# Patient Record
Sex: FEMALE | Race: WHITE | NOT HISPANIC OR LATINO | Employment: FULL TIME | URBAN - METROPOLITAN AREA
[De-identification: names, ages, dates, MRNs, and addresses within clinical notes are randomized per-mention and may not be internally consistent; named-entity substitution may affect disease eponyms.]

---

## 2018-07-08 ENCOUNTER — APPOINTMENT (OUTPATIENT)
Dept: GENERAL RADIOLOGY | Facility: CLINIC | Age: 53
End: 2018-07-08
Attending: EMERGENCY MEDICINE
Payer: COMMERCIAL

## 2018-07-08 ENCOUNTER — HOSPITAL ENCOUNTER (EMERGENCY)
Facility: CLINIC | Age: 53
Discharge: HOME | End: 2018-07-08
Attending: EMERGENCY MEDICINE | Admitting: EMERGENCY MEDICINE
Payer: COMMERCIAL

## 2018-07-08 VITALS
RESPIRATION RATE: 11 BRPM | HEIGHT: 64 IN | BODY MASS INDEX: 25.61 KG/M2 | TEMPERATURE: 97 F | WEIGHT: 150 LBS | DIASTOLIC BLOOD PRESSURE: 69 MMHG | SYSTOLIC BLOOD PRESSURE: 108 MMHG | OXYGEN SATURATION: 96 % | HEART RATE: 83 BPM

## 2018-07-08 DIAGNOSIS — R05.9 COUGH: ICD-10-CM

## 2018-07-08 DIAGNOSIS — R07.89 ATYPICAL CHEST PAIN: ICD-10-CM

## 2018-07-08 LAB
ALBUMIN SERPL-MCNC: 3.7 G/DL (ref 3.4–5)
ALP SERPL-CCNC: 61 U/L (ref 40–150)
ALT SERPL W P-5'-P-CCNC: 31 U/L (ref 0–50)
ANION GAP SERPL CALCULATED.3IONS-SCNC: 6 MMOL/L (ref 3–14)
AST SERPL W P-5'-P-CCNC: 23 U/L (ref 0–45)
BASOPHILS # BLD AUTO: 0 10E9/L (ref 0–0.2)
BASOPHILS NFR BLD AUTO: 0.3 %
BILIRUB SERPL-MCNC: 0.4 MG/DL (ref 0.2–1.3)
BUN SERPL-MCNC: 9 MG/DL (ref 7–30)
CALCIUM SERPL-MCNC: 8.7 MG/DL (ref 8.5–10.1)
CHLORIDE SERPL-SCNC: 106 MMOL/L (ref 94–109)
CO2 SERPL-SCNC: 27 MMOL/L (ref 20–32)
CREAT SERPL-MCNC: 0.59 MG/DL (ref 0.52–1.04)
DIFFERENTIAL METHOD BLD: ABNORMAL
EOSINOPHIL # BLD AUTO: 0 10E9/L (ref 0–0.7)
EOSINOPHIL NFR BLD AUTO: 0.5 %
ERYTHROCYTE [DISTWIDTH] IN BLOOD BY AUTOMATED COUNT: 12.4 % (ref 10–15)
GFR SERPL CREATININE-BSD FRML MDRD: >90 ML/MIN/1.7M2
GLUCOSE SERPL-MCNC: 109 MG/DL (ref 70–99)
HCT VFR BLD AUTO: 33.9 % (ref 35–47)
HGB BLD-MCNC: 11.8 G/DL (ref 11.7–15.7)
IMM GRANULOCYTES # BLD: 0 10E9/L (ref 0–0.4)
IMM GRANULOCYTES NFR BLD: 0.3 %
INTERPRETATION ECG - MUSE: NORMAL
LYMPHOCYTES # BLD AUTO: 2.2 10E9/L (ref 0.8–5.3)
LYMPHOCYTES NFR BLD AUTO: 29.5 %
MCH RBC QN AUTO: 31.3 PG (ref 26.5–33)
MCHC RBC AUTO-ENTMCNC: 34.8 G/DL (ref 31.5–36.5)
MCV RBC AUTO: 90 FL (ref 78–100)
MONOCYTES # BLD AUTO: 0.6 10E9/L (ref 0–1.3)
MONOCYTES NFR BLD AUTO: 7.5 %
NEUTROPHILS # BLD AUTO: 4.7 10E9/L (ref 1.6–8.3)
NEUTROPHILS NFR BLD AUTO: 61.9 %
NRBC # BLD AUTO: 0 10*3/UL
NRBC BLD AUTO-RTO: 0 /100
PLATELET # BLD AUTO: 201 10E9/L (ref 150–450)
POTASSIUM SERPL-SCNC: 3.7 MMOL/L (ref 3.4–5.3)
PROT SERPL-MCNC: 7.1 G/DL (ref 6.8–8.8)
RBC # BLD AUTO: 3.77 10E12/L (ref 3.8–5.2)
SODIUM SERPL-SCNC: 139 MMOL/L (ref 133–144)
TROPONIN I SERPL-MCNC: <0.015 UG/L (ref 0–0.04)
WBC # BLD AUTO: 7.5 10E9/L (ref 4–11)

## 2018-07-08 PROCEDURE — 80053 COMPREHEN METABOLIC PANEL: CPT | Performed by: EMERGENCY MEDICINE

## 2018-07-08 PROCEDURE — 25000125 ZZHC RX 250: Performed by: EMERGENCY MEDICINE

## 2018-07-08 PROCEDURE — 84484 ASSAY OF TROPONIN QUANT: CPT | Performed by: EMERGENCY MEDICINE

## 2018-07-08 PROCEDURE — 93005 ELECTROCARDIOGRAM TRACING: CPT

## 2018-07-08 PROCEDURE — 85025 COMPLETE CBC W/AUTO DIFF WBC: CPT | Performed by: EMERGENCY MEDICINE

## 2018-07-08 PROCEDURE — 99285 EMERGENCY DEPT VISIT HI MDM: CPT | Mod: 25

## 2018-07-08 PROCEDURE — 71046 X-RAY EXAM CHEST 2 VIEWS: CPT

## 2018-07-08 PROCEDURE — 25000132 ZZH RX MED GY IP 250 OP 250 PS 637: Performed by: EMERGENCY MEDICINE

## 2018-07-08 RX ORDER — MULTIVIT,CALC,MINS/IRON/FOLIC 500-18-0.4
TABLET ORAL
COMMUNITY
Start: 2005-10-18

## 2018-07-08 RX ORDER — ATORVASTATIN CALCIUM 10 MG/1
10 TABLET, FILM COATED ORAL
COMMUNITY
Start: 2016-03-21

## 2018-07-08 RX ORDER — PANTOPRAZOLE SODIUM 20 MG/1
20 TABLET, DELAYED RELEASE ORAL DAILY
Qty: 30 TABLET | Refills: 0 | Status: SHIPPED | OUTPATIENT
Start: 2018-07-08 | End: 2018-08-07

## 2018-07-08 RX ORDER — ASPIRIN 325 MG
325 TABLET ORAL
COMMUNITY
Start: 2016-03-21

## 2018-07-08 RX ADMIN — LIDOCAINE HYDROCHLORIDE 30 ML: 20 SOLUTION ORAL; TOPICAL at 01:48

## 2018-07-08 ASSESSMENT — ENCOUNTER SYMPTOMS
CHILLS: 1
DIARRHEA: 0
DIZZINESS: 0
LIGHT-HEADEDNESS: 0
SORE THROAT: 1
SHORTNESS OF BREATH: 1
COUGH: 1
PALPITATIONS: 1
CONSTIPATION: 1

## 2018-07-08 NOTE — ED PROVIDER NOTES
"  History     Chief Complaint:  Palpitations and Flu Symptoms    HPI   Katt Walden is an otherwise healthy 53 year old female with reported acid reflux who presents to the emergency department today for evaluation of palpitations and flu-like symptoms. The patient reports an onset of intermittent palpitations and chest pressure that began last night that last less than a minute that only occur when she is lying down. Additionally, she has been experiencing flu-like symptoms including congestion, chills, sore throat, dry cough, and constipation for the past three days and had one episode of emesis today.  Tonight, she had another episode of chest pressure with left arm pain and shortness of breath. She was concerned about these symptoms and the palpitation episodes prompting her visit to the emergency department. She denies dizziness, light headedness, and diarrhea. Of note, the patient does have a family history of MIs and heart disease in their 50s. She has had cardiac CTs in the past and notes her last one showed mild \"plaque build-up\" but did not require additional intervention.     Allergies:  Sulfa Drugs    Medications:    Asprin  Atorvastatin  Docosahexaenoic acid   Multiple Vitamins-calcium    Past Medical History:    History reviewed. No pertinent past medical history.    Past Surgical History:        Family History:    Heart disease    Social History:  The patient was accompanied to the ED by sons.  Smoking Status: Former  Smokeless Tobacco: Never  Alcohol Use: No  Marital Status:      Review of Systems   Constitutional: Positive for chills.   HENT: Positive for congestion and sore throat.    Respiratory: Positive for cough and shortness of breath.    Cardiovascular: Positive for chest pain and palpitations. Negative for leg swelling.   Gastrointestinal: Positive for constipation. Negative for diarrhea.   Neurological: Negative for dizziness and light-headedness.   All other systems " "reviewed and are negative.    Physical Exam     Patient Vitals for the past 24 hrs:   BP Temp Temp src Pulse Heart Rate Resp SpO2 Height Weight   07/08/18 0106 - 97  F (36.1  C) Temporal - - - - - -   07/08/18 0102 129/76 - - 83 83 20 96 % 1.626 m (5' 4\") 68 kg (150 lb)         Physical Exam  Gen: Pleasant, appears stated age.    Eye:   Pupils are equal, round, and reactive.     Sclera non-injected.    ENT:   Moist mucus membranes.     Normal tongue.    Oropharynx without lesions.    Cardiac:     Normal rate and regular rhythm.    No murmurs, gallops, or rubs.   2+ radial pulses    Pulmonary:     Clear to auscultation bilaterally.    No wheezes, rales, or rhonchi.    Abdomen:     Normal active bowel sounds.     Abdomen is soft and non-distended, without focal tenderness.    Musculoskeletal:     Normal movement of all extremities without evidence for deficit.   Anterior chest wall tenderness    Extremities:    No edema.   Calves non-tender    Skin:   Warm and dry.    Neurologic:    Non-focal exam without asymmetric weakness or numbness.    Normal tone    Psychiatric:     Normal affect with appropriate interaction with examiner.    Emergency Department Course   ECG:  Indication: palpitations  Completed at 0103.  Read at 0138.   Normal sinus rhythm  Normal ECG   Rate 84 bpm. NY interval 146. QRS duration 88. QT/QTc 354/418. P-R-T axes 63  72  47.    Imaging:  Radiology findings were communicated with the patient and family who voiced understanding of the findings.  XR Chest 2 views  IMPRESSION: PA and lateral views of the chest. Lungs are clear. Heart  is normal in size. No effusions are evident. No pneumothorax.  Report per radiology     Laboratory:  Laboratory findings were communicated with the patient and family who voiced understanding of the findings.  CBC: WNL. (WBC 7.5, HGB 11.8, )   CMP: glucose 109 (H) o/w WNL (Creatinine 0.59)    Interventions:  0148 GI Cocktail 30mg PO    Emergency Department " "Course:  Nursing notes and vitals reviewed.  IV was inserted and blood was drawn for laboratory testing, results above.  The patient was sent for a XR Chest 2 views while in the emergency department, results above.   0120: I performed an exam of the patient as documented above.   0323: Patient rechecked and updated. The patient is feeling better after GI cocktail  Findings and plan explained to the Patient and sons. Patient discharged home with instructions regarding supportive care, medications, and reasons to return. The importance of close follow-up was reviewed. The patient was prescribed Protonix.   I personally reviewed the laboratory and imaging results with the Patient and sons and answered all related questions prior to discharge.    Impression & Plan    Medical Decision Making:  Katt Walden is a 53 year old female with a history of IBS who presents today with a constellation of symptoms including chest pressure, and \"flu-like symptoms\". On exam, the patient is well-appearing with normal vitals. She does not appear toxic. In regards to her chest discomfort, her history is very atypical for acute coronary syndrome. Symptoms seems worse with lying flat and she does not notice them at all when exerting herself. Overall, my suspicion for acute coronary syndrome is very low. She has a normal appearing EKG and negative troponin. She reportedly has non-obstructive coronary artery disease based on CT chest. Otherwise, I do not suspect pericarditis, PE, pneumonia, or aortic dissection. Chest radiograph otherwise appears normal. Otherwise, labs were only remarkable for anemia. Patient reportedly has a history of anemia in the past. I have recommended PCP follow up. At this point, I think that she is safe to be discharged home. She did get some relief with GI cocktail, therefore I started her on Protonix. She will return to the emergency department with any worsening symptoms. At this point, I do not think stress " testing is indicated given symptoms are unlikely to represent ACS.     Diagnosis:    ICD-10-CM    1. Cough R05    2. Atypical chest pain R07.89        Disposition:  discharged to home    Discharge Medications:  New Prescriptions    PANTOPRAZOLE (PROTONIX) 20 MG EC TABLET    Take 1 tablet (20 mg) by mouth daily for 30 doses Take 30 min before eating.       Scribe Disclosure:  Armin FULLER, lizeth serving as a scribe at 1:16 AM on 7/8/2018 to document services personally performed by Alina Swain MD based on my observations and the provider's statements to me.     7/8/2018   [facility] EMERGENCY DEPARTMENT       Alina Swain MD  07/08/18 0702

## 2018-07-08 NOTE — ED TRIAGE NOTES
Pt arrives to the ED for palpitations that began yesterday. Denies any SOB, or dizziness. Pt states she has been having flu like symptoms since Thursday and that she had an episode of vomiting today. Pt states having family h/o of cardiac issues.

## 2018-07-08 NOTE — ED AVS SNAPSHOT
[facility]    [address]    [facility] [address]    Phone:  [phone]    Fax:  [phone]                                       Katt Walden   MRN: [id number]    Department:  [facility]   Date of Visit:  7/8/2018           Patient Information     Date Of Birth          1965        Your diagnoses for this visit were:     Cough     Atypical chest pain        You were seen by Alina Swain MD.      Follow-up Information     Follow up with [facility].    Specialty:  EMERGENCY MEDICINE    Why:  immediately , If symptoms worsen    Contact information:    [address]  [facility] [address] [phone]        Follow up with C[facility] In 3 days.    Specialty:  Family Practice    Why:  for recheck of your symptoms    Contact information:    [facility]  [address]  [facility] [id number] [phone]          Discharge Instructions       Discharge Instructions  Chest Pain    You have been seen today for chest pain or discomfort.  At this time, your doctor has found no signs that your chest pain is due to a serious or life-threatening condition, (or you have declined more testing and/or admission to the hospital). However, sometimes there is a serious problem that does not show up right away. Your evaluation today may not be complete and you may need further testing and evaluation.     You need to follow-up with your regular doctor within 3 days.    Return to the Emergency Department if:    Your chest pain changes, gets worse, starts to happen more often, or comes with less activity.    You are short of breath.    You get very weak or tired.    You pass out or faint.    You have any new symptoms, like fever, cough, numb legs, or you cough up blood.    You have anything else that worries you.    Until you follow-up with your regular doctor  please do the following:    Take one aspirin daily unless you have an allergy or are told not to by your doctor.    If a stress test appointment has been made, go to the appointment.    If you have questions, contact your regular doctor.    If your doctor today has told you to follow-up with your regular doctor, it is very important that you make an appointment with your clinic and go to the appointment.  If you do not follow-up with your primary doctor, it may result in missing an important development which could result in permanent injury or disability and/or lasting pain.  If there is any problem keeping your appointment, call your doctor or return to the Emergency Department.    If you were given a prescription for medicine here today, be sure to read all of the information (including the package insert) that comes with your prescription.  This will include important information about the medicine, its side effects, and any warnings that you need to know about.  The pharmacist who fills the prescription can provide more information and answer questions you may have about the medicine.  If you have questions or concerns that the pharmacist cannot address, please call or return to the Emergency Department.         24 Hour Appointment Hotline       To make an appointment at any [facility], call [phone] ([phone]). If you don't have a family doctor or clinic, we will help you find one. [facility] are conveniently located to serve the needs of you and your family.             Review of your medicines      START taking        Dose / Directions Last dose taken    pantoprazole 20 MG EC tablet   Commonly known as:  PROTONIX   Dose:  20 mg   Quantity:  30 tablet        Take 1 tablet (20 mg) by mouth daily for 30 doses Take 30 min before eating.   Refills:  0          Our records show that you are taking the medicines listed below. If these are incorrect, please call your family doctor or clinic.         Dose / Directions Last dose taken    aspirin 325 MG tablet   Dose:  325 mg        Take 325 mg by mouth   Refills:  0        atorvastatin 10 MG tablet   Commonly known as:  LIPITOR   Dose:  10 mg        Take 10 mg by mouth   Refills:  0        DOCOSAHEXAENOIC ACID PO        Refills:  0        ONE-A-DAY WOMENS FORMULA Tabs        Refills:  0                Prescriptions were sent or printed at these locations (1 Prescription)                   Other Prescriptions                Printed at Department/Unit printer (1 of 1)         pantoprazole (PROTONIX) 20 MG EC tablet                Procedures and tests performed during your visit     CBC with platelets differential    Cardiac Continuous Monitoring    Comprehensive metabolic panel    EKG 12 lead    Peripheral IV catheter    Troponin I    XR Chest 2 Views      Orders Needing Specimen Collection     None      Pending Results     No orders found from 7/6/2018 to 7/9/2018.            Pending Culture Results     No orders found from 7/6/2018 to 7/9/2018.            Pending Results Instructions     If you had any lab results that were not finalized at the time of your Discharge, you can call the ED Lab Result RN at [phone]. You will be contacted by this team for any positive Lab results or changes in treatment. The nurses are available 7 days a week from 10A to 6:30P.  You can leave a message 24 hours per day and they will return your call.        Test Results From Your Hospital Stay        7/8/2018  1:57 AM      Component Results     Component Value Ref Range & Units Status    WBC 7.5 4.0 - 11.0 10e9/L Final    RBC Count 3.77 (L) 3.8 - 5.2 10e12/L Final    Hemoglobin 11.8 11.7 - 15.7 g/dL Final    Hematocrit 33.9 (L) 35.0 - 47.0 % Final    MCV 90 78 - 100 fl Final    MCH 31.3 26.5 - 33.0 pg Final    MCHC 34.8 31.5 - 36.5 g/dL Final    RDW 12.4 10.0 - 15.0 % Final    Platelet Count 201 150 - 450 10e9/L Final    Diff Method Automated Method  Final    % Neutrophils  61.9 % Final    % Lymphocytes 29.5 % Final    % Monocytes 7.5 % Final    % Eosinophils 0.5 % Final    % Basophils 0.3 % Final    % Immature Granulocytes 0.3 % Final    Nucleated RBCs 0 0 /100 Final    Absolute Neutrophil 4.7 1.6 - 8.3 10e9/L Final    Absolute Lymphocytes 2.2 0.8 - 5.3 10e9/L Final    Absolute Monocytes 0.6 0.0 - 1.3 10e9/L Final    Absolute Eosinophils 0.0 0.0 - 0.7 10e9/L Final    Absolute Basophils 0.0 0.0 - 0.2 10e9/L Final    Abs Immature Granulocytes 0.0 0 - 0.4 10e9/L Final    Absolute Nucleated RBC 0.0  Final         7/8/2018  2:14 AM      Component Results     Component Value Ref Range & Units Status    Sodium 139 133 - 144 mmol/L Final    Potassium 3.7 3.4 - 5.3 mmol/L Final    Chloride 106 94 - 109 mmol/L Final    Carbon Dioxide 27 20 - 32 mmol/L Final    Anion Gap 6 3 - 14 mmol/L Final    Glucose 109 (H) 70 - 99 mg/dL Final    Urea Nitrogen 9 7 - 30 mg/dL Final    Creatinine 0.59 0.52 - 1.04 mg/dL Final    GFR Estimate >90 >60 mL/min/1.7m2 Final    Non  GFR Calc    GFR Estimate If Black >90 >60 mL/min/1.7m2 Final    African American GFR Calc    Calcium 8.7 8.5 - 10.1 mg/dL Final    Bilirubin Total 0.4 0.2 - 1.3 mg/dL Final    Albumin 3.7 3.4 - 5.0 g/dL Final    Protein Total 7.1 6.8 - 8.8 g/dL Final    Alkaline Phosphatase 61 40 - 150 U/L Final    ALT 31 0 - 50 U/L Final    AST 23 0 - 45 U/L Final         7/8/2018  2:24 AM      Narrative     XR CHEST 2 VIEWS   7/8/2018 2:01 AM     HISTORY: Chest pain.     COMPARISON: None.        Impression     IMPRESSION: PA and lateral views of the chest. Lungs are clear. Heart  is normal in size. No effusions are evident. No pneumothorax.    HARVINDER HILL MD         7/8/2018  2:56 AM      Component Results     Component Value Ref Range & Units Status    Troponin I ES <0.015 0.000 - 0.045 ug/L Final    The 99th percentile for upper reference range is 0.045 ug/L.  Troponin values   in the range of 0.045 - 0.120 ug/L may be associated with  risks of adverse   clinical events.                  Clinical Quality Measure: Blood Pressure Screening     Your blood pressure was checked while you were in the emergency department today. The last reading we obtained was  BP: 111/74 . Please read the guidelines below about what these numbers mean and what you should do about them.  If your systolic blood pressure (the top number) is less than 120 and your diastolic blood pressure (the bottom number) is less than 80, then your blood pressure is normal. There is nothing more that you need to do about it.  If your systolic blood pressure (the top number) is 120-139 or your diastolic blood pressure (the bottom number) is 80-89, your blood pressure may be higher than it should be. You should have your blood pressure rechecked within a year by a primary care provider.  If your systolic blood pressure (the top number) is 140 or greater or your diastolic blood pressure (the bottom number) is 90 or greater, you may have high blood pressure. High blood pressure is treatable, but if left untreated over time it can put you at risk for heart attack, stroke, or kidney failure. You should have your blood pressure rechecked by a primary care provider within the next 4 weeks.  If your provider in the emergency department today gave you specific instructions to follow-up with your doctor or provider even sooner than that, you should follow that instruction and not wait for up to 4 weeks for your follow-up visit.        Thank you for choosing [facility]       Thank you for choosing [facility] for your care. Our goal is always to provide you with excellent care. Hearing back from our patients is one way we can continue to improve our services. Please take a few minutes to complete the written survey that you may receive in the mail after you visit with us. Thank you!        [employer] Information     [employer] lets you send messages to your doctor, view your test results, renew your  "prescriptions, schedule appointments and more. To sign up, go to [email or web address] . Click on \"Log in\" on the left side of the screen, which will take you to the Welcome page. Then click on \"Sign up Now\" on the right side of the page.     You will be asked to enter the access code listed below, as well as some personal information. Please follow the directions to create your username and password.     Your access code is: [email or web address]  Expires: 10/6/2018  3:36 AM     Your access code will  in 90 days. If you need help or a new code, please call your [facility] clinic or [phone].        Care EveryWhere ID     This is your Care EveryWhere ID. This could be used by other organizations to access your [facility] medical records  [id number]        Equal Access to Services     JESUS BALTAZAR : Abbey Fraire, shukri epps, mani campuzano, vitkoria boswell . So [facility] [phone].    ATENCIÓN: Si habla español, tiene a ellsworth disposición servicios gratuitos de asistencia lingüística. Llame al [phone].    We comply with applicable federal civil rights laws and [place] laws. We do not discriminate on the basis of race, color, national origin, age, disability, sex, sexual orientation, or gender identity.            After Visit Summary       This is your record. Keep this with you and show to your community pharmacist(s) and doctor(s) at your next visit.                  "

## 2018-07-08 NOTE — ED AVS SNAPSHOT
[facility]    [address]    [facility] [address]    Phone:  [phone]    Fax:  [phone]                                       Katt Walden   MRN: [id number]    Department:  [facility]   Date of Visit:  7/8/2018           After Visit Summary Signature Page     I have received my discharge instructions, and my questions have been answered. I have discussed any challenges I see with this plan with the nurse or doctor.    ..........................................................................................................................................  Patient/Patient Representative Signature      ..........................................................................................................................................  Patient Representative Print Name and Relationship to Patient    ..................................................               ................................................  Date                                            Time    ..........................................................................................................................................  Reviewed by Signature/Title    ...................................................              ..............................................  Date                                                            Time

## 2021-05-28 ENCOUNTER — RECORDS - HEALTHEAST (OUTPATIENT)
Dept: ADMINISTRATIVE | Facility: CLINIC | Age: 56
End: 2021-05-28

## 2023-01-24 ENCOUNTER — HOSPITAL ENCOUNTER (EMERGENCY)
Facility: CLINIC | Age: 58
Discharge: HOME | End: 2023-01-24
Attending: EMERGENCY MEDICINE | Admitting: EMERGENCY MEDICINE
Payer: COMMERCIAL

## 2023-01-24 VITALS
HEART RATE: 98 BPM | OXYGEN SATURATION: 100 % | TEMPERATURE: 97.1 F | RESPIRATION RATE: 16 BRPM | SYSTOLIC BLOOD PRESSURE: 125 MMHG | DIASTOLIC BLOOD PRESSURE: 86 MMHG

## 2023-01-24 DIAGNOSIS — N39.0 URINARY TRACT INFECTION WITHOUT HEMATURIA, SITE UNSPECIFIED: ICD-10-CM

## 2023-01-24 DIAGNOSIS — L50.9 URTICARIA: ICD-10-CM

## 2023-01-24 LAB
ALBUMIN UR-MCNC: 10 MG/DL
APPEARANCE UR: ABNORMAL
BACTERIA #/AREA URNS HPF: ABNORMAL /HPF
BILIRUB UR QL STRIP: NEGATIVE
COLOR UR AUTO: ABNORMAL
GLUCOSE UR STRIP-MCNC: NEGATIVE MG/DL
HGB UR QL STRIP: ABNORMAL
HYALINE CASTS: 1 /LPF
KETONES UR STRIP-MCNC: NEGATIVE MG/DL
LEUKOCYTE ESTERASE UR QL STRIP: ABNORMAL
MUCOUS THREADS #/AREA URNS LPF: PRESENT /LPF
NITRATE UR QL: NEGATIVE
PH UR STRIP: 5.5 [PH] (ref 5–7)
RBC URINE: 12 /HPF
SP GR UR STRIP: 1.02 (ref 1–1.03)
UROBILINOGEN UR STRIP-MCNC: NORMAL MG/DL
WBC URINE: 135 /HPF

## 2023-01-24 PROCEDURE — 250N000011 HC RX IP 250 OP 636: Performed by: EMERGENCY MEDICINE

## 2023-01-24 PROCEDURE — 250N000013 HC RX MED GY IP 250 OP 250 PS 637: Performed by: EMERGENCY MEDICINE

## 2023-01-24 PROCEDURE — 87086 URINE CULTURE/COLONY COUNT: CPT | Performed by: EMERGENCY MEDICINE

## 2023-01-24 PROCEDURE — 81001 URINALYSIS AUTO W/SCOPE: CPT | Performed by: EMERGENCY MEDICINE

## 2023-01-24 PROCEDURE — 99283 EMERGENCY DEPT VISIT LOW MDM: CPT

## 2023-01-24 RX ORDER — CETIRIZINE HYDROCHLORIDE 10 MG/1
10 TABLET ORAL 2 TIMES DAILY PRN
Qty: 20 TABLET | Refills: 0 | Status: SHIPPED | OUTPATIENT
Start: 2023-01-24 | End: 2023-02-03

## 2023-01-24 RX ORDER — PREDNISONE 20 MG/1
TABLET ORAL
Qty: 4 TABLET | Refills: 0 | Status: SHIPPED | OUTPATIENT
Start: 2023-01-24

## 2023-01-24 RX ORDER — CEPHALEXIN 500 MG/1
500 CAPSULE ORAL ONCE
Status: COMPLETED | OUTPATIENT
Start: 2023-01-24 | End: 2023-01-24

## 2023-01-24 RX ORDER — DIPHENHYDRAMINE HCL 25 MG
25 CAPSULE ORAL ONCE
Status: COMPLETED | OUTPATIENT
Start: 2023-01-24 | End: 2023-01-24

## 2023-01-24 RX ORDER — CEPHALEXIN 500 MG/1
500 CAPSULE ORAL 3 TIMES DAILY
Qty: 28 CAPSULE | Refills: 0 | Status: SHIPPED | OUTPATIENT
Start: 2023-01-24 | End: 2023-01-29

## 2023-01-24 RX ADMIN — DEXAMETHASONE 10 MG: 2 TABLET ORAL at 00:35

## 2023-01-24 RX ADMIN — DIPHENHYDRAMINE HYDROCHLORIDE 25 MG: 25 CAPSULE ORAL at 00:35

## 2023-01-24 ASSESSMENT — ACTIVITIES OF DAILY LIVING (ADL): ADLS_ACUITY_SCORE: 33

## 2023-01-24 NOTE — DISCHARGE INSTRUCTIONS
We suspect your mild skin rash is likely related to a reaction from Macrobid.  Please stop this antibiotic.  Use either Benadryl or nonsedating Zyrtec for antihistamine.  Use steroid for 2 more days due to reaction.  We are recommending Keflex to treat urinary tract infection.  We are doing a culture if you need a different antibiotic we will call you.

## 2023-01-24 NOTE — ED TRIAGE NOTES
57F presents to ED for concern for reaction to medication. Pt was prescribed nitrofurantoin for a UTI and took the first dose the PM approx 830.  Pt noted feeling flushed, a mild itching/burning sensation on her upper chest and some hives on her upper chest.  Pt denied any SOB or any airway swelling/tightness.  VSS.  A&Ox4     Triage Assessment     Row Name 01/24/23 0007       Triage Assessment (Adult)    Airway WDL WDL       Respiratory WDL    Respiratory WDL WDL       Skin Circulation/Temperature WDL    Skin Circulation/Temperature WDL WDL       Cardiac WDL    Cardiac WDL WDL       Peripheral/Neurovascular WDL    Peripheral Neurovascular WDL WDL       Cognitive/Neuro/Behavioral WDL    Cognitive/Neuro/Behavioral WDL WDL

## 2023-01-25 LAB — BACTERIA UR CULT: NORMAL

## 2023-01-25 NOTE — RESULT ENCOUNTER NOTE
Final urine culture report is negative.  Adult Negative Urine culture parameters per protocol: Any # Urogenital single or mixed organism, <10,000 col/ml single organism (cath/midstream), and > 3 organisms (No susceptibilities performed).  [facility] Emergency Dept discharge antibiotic prescribed (If applicable): Cephalexin  Treatment recommendations per [facility] ED Lab Result Urine Culture protocol.

## 2023-01-30 NOTE — ED PROVIDER NOTES
History     Chief Complaint:  Hives       HPI   Katt Walden is a 57 year old female who presents with itching and feeling after taking nitrofurantoin.  Patient is a 49-year-old female has not taken nitrofurantoin before.  Patient developed some rash and itching in the chest.  No shortness of breath.  No chest pain.  No fever.      Independent Historian:      Review of External Notes:      ROS:  Review of Systems      Allergies:  Sulfa Drugs     Medications:    cetirizine (ZYRTEC) 10 MG tablet  predniSONE (DELTASONE) 20 MG tablet  aspirin 325 MG tablet  atorvastatin (LIPITOR) 10 MG tablet  DOCOSAHEXAENOIC ACID PO  Multiple Vitamins-Calcium (ONE-A-DAY WOMENS FORMULA) TABS        Past Medical History:    No past medical history on file.    Past Surgical History:    No past surgical history on file.     Family History:    family history is not on file.    Social History:   reports that she has quit smoking. She has never used smokeless tobacco. She reports that she does not drink alcohol and does not use drugs.  PCP: No Ref-Primary, Physician     Physical Exam   No data found.     Physical Exam      Emergency Department Course     Laboratory:  Labs Ordered and Resulted from Time of ED Arrival to Time of ED Departure   UA MACROSCOPIC WITH REFLEX TO MICRO AND CULTURE - Abnormal       Result Value    Color Urine Light Yellow      Appearance Urine Slightly Cloudy (*)     Glucose Urine Negative      Bilirubin Urine Negative      Ketones Urine Negative      Specific Gravity Urine 1.020      Blood Urine Moderate (*)     pH Urine 5.5      Protein Albumin Urine 10 (*)     Urobilinogen Urine Normal      Nitrite Urine Negative      Leukocyte Esterase Urine Large (*)     Bacteria Urine Few (*)     Mucus Urine Present (*)     RBC Urine 12 (*)     WBC Urine 135 (*)     Hyaline Casts Urine 1            Emergency Department Course & Assessments:             Interventions:  Medications   diphenhydrAMINE (BENADRYL) capsule 25  mg (25 mg Oral Given 1/24/23 0035)   dexamethasone (DECADRON) tablet 10 mg (10 mg Oral Given 1/24/23 0035)   cephALEXin (KEFLEX) capsule 500 mg (500 mg Oral Not Given 1/24/23 0117)        Independent Interpretation (X-rays, CTs, rhythm strip):      Consultations/Discussion of Management or Tests:          Social Determinants of Health affecting care:       Disposition:  The patient was discharged to home.     Impression & Plan      Medical Decision Making:  Patient presents after virtual visit and prescribed Macrodantin.  Clinically suspect hives or urticaria secondary to antibiotics.  This was discontinued UA still shows infection we will switch to Keflex and culture the urine.  Patient's discharged home with steroid and Benadryl and antihistamine medications for itching.    Critical Care time:  was 0 minutes for this patient excluding procedures.    Diagnosis:    ICD-10-CM    1. Urticaria  L50.9       2. Urinary tract infection without hematuria, site unspecified  N39.0            Discharge Medications:  Discharge Medication List as of 1/24/2023  1:19 AM      START taking these medications    Details   cephALEXin (KEFLEX) 500 MG capsule Take 1 capsule (500 mg) by mouth 3 times daily for 5 days, Disp-28 capsule, R-0, Local Print      cetirizine (ZYRTEC) 10 MG tablet Take 1 tablet (10 mg) by mouth 2 times daily as needed for allergies (1 tab up to twice a day as needed for itch, rash, hives, allergy), Disp-20 tablet, R-0, Local Print      predniSONE (DELTASONE) 20 MG tablet Take two tablets (= 40mg) each day for two days, Disp-4 tablet, R-0, Local Print              Scribe Disclosure:  I, Yoandy Martinez MD, am serving as a scribe at 12:54 AM on 1/30/2023 to document services personally performed by No att. providers found based on my observations and the provider's statements to me.     1/30/2023   No att. providers found            Yoandy Martinez MD  01/30/23 0055